# Patient Record
Sex: MALE | Race: WHITE | NOT HISPANIC OR LATINO | Employment: FULL TIME | URBAN - METROPOLITAN AREA
[De-identification: names, ages, dates, MRNs, and addresses within clinical notes are randomized per-mention and may not be internally consistent; named-entity substitution may affect disease eponyms.]

---

## 2018-07-05 ENCOUNTER — OFFICE VISIT (OUTPATIENT)
Dept: NEUROSURGERY | Facility: CLINIC | Age: 58
End: 2018-07-05

## 2018-07-05 VITALS — TEMPERATURE: 98 F | BODY MASS INDEX: 37.51 KG/M2 | HEIGHT: 70 IN | WEIGHT: 262 LBS

## 2018-07-05 DIAGNOSIS — M51.36 DDD (DEGENERATIVE DISC DISEASE), LUMBAR: ICD-10-CM

## 2018-07-05 DIAGNOSIS — Z98.1 HX OF SPINAL FUSION: Primary | ICD-10-CM

## 2018-07-05 PROCEDURE — 99243 OFF/OP CNSLTJ NEW/EST LOW 30: CPT | Performed by: NEUROLOGICAL SURGERY

## 2018-07-05 RX ORDER — LOSARTAN POTASSIUM AND HYDROCHLOROTHIAZIDE 25; 100 MG/1; MG/1
1 TABLET ORAL DAILY
COMMUNITY

## 2018-07-05 RX ORDER — CYCLOBENZAPRINE HCL 5 MG
5 TABLET ORAL 3 TIMES DAILY PRN
COMMUNITY

## 2018-07-05 NOTE — PROGRESS NOTES
Subjective   Patient ID: Steve Veliz is a 58 y.o. male is being seen for consultation today at the request of Kali Purcell MD  Chief Complaint: Low back pain    History of Present Illness: The patient is a 58-year-old man who is employed but having increasing degree of lower back pain makes it difficult to perform normal daily activities including normal work obligations and duties.  Pain is in the lower lumbar region and radiates to the legs on each side with numbness from his back radiating from his legs to his ankles.  This started in February 2018.  Heat to the back help sometimes.  It's worse with sitting too long or standing on his feet too long.  He has a history of L5-S1 right TLIF with pedicle screw fixation September 2017 for recurrent disc herniation with severe degenerative disc.  He return to work after that surgery.    Review of Radiographic Studies:  Lumbar MRI scan shows degenerative disc and facet joint changes throughout the lumbar spine.  There is no significant stenosis of the spinal canal or foramina on each side on my review of the images.  There is a prior interbody fusion with pedicle screw fixation at L5-S1.    The following portions of the patient's history were reviewed, updated as appropriate and approved: allergies, current medications, past family history, past medical history, past social history, past surgical history, review of systems and problem list.  Review of Systems   Constitutional: Negative for activity change, appetite change, chills, diaphoresis, fatigue, fever and unexpected weight change.   HENT: Negative for congestion, dental problem, drooling, ear discharge, ear pain, facial swelling, hearing loss, mouth sores, nosebleeds, postnasal drip, rhinorrhea, sinus pressure, sneezing, sore throat, tinnitus, trouble swallowing and voice change.    Eyes: Negative for photophobia, pain, discharge, redness, itching and visual disturbance.   Respiratory: Negative for  apnea, cough, choking, chest tightness, shortness of breath, wheezing and stridor.    Cardiovascular: Negative for chest pain, palpitations and leg swelling.   Gastrointestinal: Negative for abdominal distention, abdominal pain, anal bleeding, blood in stool, constipation, diarrhea, nausea, rectal pain and vomiting.   Endocrine: Negative for cold intolerance, heat intolerance, polydipsia, polyphagia and polyuria.   Genitourinary: Negative for decreased urine volume, difficulty urinating, dysuria, enuresis, flank pain, frequency, genital sores, hematuria and urgency.   Musculoskeletal: Positive for arthralgias, back pain and myalgias. Negative for gait problem, joint swelling, neck pain and neck stiffness.   Skin: Negative for color change, pallor, rash and wound.   Allergic/Immunologic: Negative for environmental allergies, food allergies and immunocompromised state.   Neurological: Positive for weakness and numbness. Negative for dizziness, tremors, seizures, syncope, facial asymmetry, speech difficulty, light-headedness and headaches.   Hematological: Negative for adenopathy. Does not bruise/bleed easily.   Psychiatric/Behavioral: Negative for agitation, behavioral problems, confusion, decreased concentration, dysphoric mood, hallucinations, self-injury, sleep disturbance and suicidal ideas. The patient is not nervous/anxious and is not hyperactive.    All other systems reviewed and are negative.      Objective     NEUROLOGICAL EXAMINATION:      MENTAL STATUS:  Alert and oriented.  Speech intact.  Recent and remote memory intact.      CRANIAL NERVES:  Cranial nerve II:  Visual fields are full.  Cranial nerves III, IV and VI:  PERRLADC.  Extraocular movements are intact.  Nystagmus is not present.  Cranial nerve V:  Facial sensation is intact.  Cranial nerve VII:  Muscles of facial expression reveal no asymmetry.  Cranial nerve VIII:  Hearing is intact.  Cranial nerves IX and X:  Palate elevates  symmetrically.  Cranial nerve XI:  Shoulder shrug is intact.  Cranial nerve XII:  Tongue is midline without evidence of atrophy or fasciculation.    MUSCULOSKELETAL:  SLR negative. Jarek's test negative.  Height 5 feet 10 inches, weight 262 pounds.    MOTOR:  Intact knee extension, ankle dorsiflexion, and plantar flexion bilaterally.    SENSATION: Intact to touch over both legs and feet.    REFLEXES:  DTR absent knee and ankle reflexes bilaterally.    Assessment   Multilevel lumbar degenerative disc and facet disease.  Chronic back pain.  No radiculopathy, instability, disc herniation, or clinically significant stenosis of the lumbar spine.       Plan   Surgery has no potential benefit for him.  Lumbar FELI, pain management approach may be helpful in prolonging his work career and improving daily comfort and a referral will be made.       Diego Leonardo MD

## 2018-07-09 ENCOUNTER — TELEPHONE (OUTPATIENT)
Dept: PAIN MEDICINE | Facility: CLINIC | Age: 58
End: 2018-07-09